# Patient Record
(demographics unavailable — no encounter records)

---

## 2024-10-14 NOTE — HISTORY OF PRESENT ILLNESS
[FreeTextEntry1] : LAST VISIT - 7/12/24 35yo male h/o intellectual disability, MVR presents for follow up after hospitalization with complicated diverticulitis resulting in abscess and perforation requiring ex lap with washout and small bowel resection for F/U today post repeat CT scan of Abdomen.   CT scan of Abdomen done 10/4/24 showed mild wall thickening of the sigmoid colon which may reflect mild diverticulitis.   Pt is now on antibiotics and has had mild diarrhea since started them, has had occasional loose stools over the past year. No fevers, abd pain, blood in the stool, weight loss, vomiting, heartburn, or dysphagia.   Pt was seen in Dec 2023, reporting diarrhea, had stool tests which were negative for infectious etiologies. He saw colorectal surgery 10/26/23 for an external thrombosed hemorrhoid and sitz baths were recommended along with high fiber diet.   C difficile negative GI PCR, ova/parasites, culture negative  [facility] Fax [phone]  [de-identified] : 10/4/24

## 2024-10-14 NOTE — ASSESSMENT
[FreeTextEntry1] : 37yo male h/o intellectual disability, MVR with hospitalization in 2/2023 with complicated diverticulitis resulting in abscess and perforation requiring ex lap with washout and small bowel resection.  #Complicated diverticulitis with abscess and perforation requiring small bowel resection (2023) #Possible mild diverticulitis of the sigmoid colon on repeat CT scan  #Altered bowel pattern Clinically improved, no infectious etiologies on stool tests -Discussed colonoscopy to further evaluate in setting of h/o complicated diverticulitis, risks/benefits/alternatives discussed in detail with pts mother and father, pt likely not able to tolerate colon prep or enemas for possible sigmoidoscopy, discussed options for elective admission, NGT prep however they feel pt will not comply- -CT colonography recommended in July but his parents felt that he could not tolerate it -Complete antibiotics  -Recommend fecal occult blood immunology for CRC screening; his father will collect the stool, risks/benefits discussed  -Diet discussed including adequate hydration and fiber intake -Sigmoidoscopy vs repeat CT scan of Abdomen discussed in 1-2 months; will repeat CT scan of Abdomen and pelvis with IV contrast in 6-8 weeks for now due to concerns with administrating the enemas, to readdress after CT scan -Repeat CBC, CMP, INR, ESR, and CRP  -F/U in December  -Pts father/mother agreeable with plan, advised to contact us if questions/concerns or if new symptoms develop

## 2024-10-14 NOTE — PHYSICAL EXAM
[Alert] : alert [No Acute Distress] : no acute distress [Well Developed] : well developed [Obese (BMI >= 30)] : obese (BMI >= 30) [Sclera] : the sclera and conjunctiva were normal [Hearing Threshold Finger Rub Not [place]] : hearing was normal [Normal Appearance] : the appearance of the neck was normal [No Respiratory Distress] : no respiratory distress [No Acc Muscle Use] : no accessory muscle use [Respiration, Rhythm And Depth] : normal respiratory rhythm and effort [Bowel Sounds] : normal bowel sounds [Abdomen Tenderness] : non-tender [No Masses] : no abdominal mass palpated [Abdomen Soft] : soft [Abnormal Walk] : normal gait [Normal Color / Pigmentation] : normal skin color and pigmentation [de-identified] : Abd soft, no tenderness elicited, well healed lower abdominal scar [de-identified] : Nonverbal so unable to assess orientation

## 2025-03-07 NOTE — HISTORY OF PRESENT ILLNESS
[FreeTextEntry1] : LAST VISIT - 10/14/24 38yo male h/o intellectual disability, MVR presents for follow up after hospitalization with complicated diverticulitis resulting in abscess and perforation requiring ex lap with washout and small bowel resection. Presents for follow up.   Pt presents with his parents. As per parents, they are unaware of all bowel movements pt has (especially while patient is at the group home during the week) however while home with them, bowel movements have been normal. As per parents' patient is not losing weight, no blood in the stool. Eating well.    Labs reviewed 10/14/24 CBC WNL ESR WNL  CRP WNL  CMP WNL   Pts mother and father on video call. Pt last seen in Dec 2023, reporting diarrhea, had stool tests which were negative for infectious etiologies. Per pts parents, he is doing well, bowel pattern regular, no recurrent diarrhea or blood in stools. Denies n/v or abdominal pain. Appetite good, weight stable. Colonoscopy discussed however deferred at prior visit as concern pt not able to tolerate prep, CT imaging not yet done. He saw colorectal surgery 10/26/23 for an external thrombosed hemorrhoid and sitz baths were recommended along with high fiber diet. Symptoms resolved  C difficile negative GI PCR, ova/parasites, culture negative  12/3/24 IFOBT Negative  CT Abdomen/pelvis w/oral  and w/ IV contrast 2/15/25 IMPRESSION: Colonic diverticulosis without diverticulitis. Postoperative changes. Simple cyst, right kidney.  [facility] Fax [phone]

## 2025-03-07 NOTE — PHYSICAL EXAM
[Alert] : alert [Normal Voice/Communication] : normal voice/communication [Healthy Appearing] : healthy appearing [No Acute Distress] : no acute distress [Sclera] : the sclera and conjunctiva were normal [Hearing Threshold Finger Rub Not [place]] : hearing was normal [Normal Lips/Gums] : the lips and gums were normal [Oropharynx] : the oropharynx was normal [Normal Appearance] : the appearance of the neck was normal [No Neck Mass] : no neck mass was observed [No Respiratory Distress] : no respiratory distress [No Acc Muscle Use] : no accessory muscle use [Respiration, Rhythm And Depth] : normal respiratory rhythm and effort [Auscultation Breath Sounds / Voice Sounds] : lungs were clear to auscultation bilaterally [Heart Rate And Rhythm] : heart rate was normal and rhythm regular [Normal S1, S2] : normal S1 and S2 [Murmurs] : no murmurs [Bowel Sounds] : normal bowel sounds [Abdomen Tenderness] : non-tender [No Masses] : no abdominal mass palpated [Abdomen Soft] : soft [] : no hepatosplenomegaly [Oriented To Time, Place, And Person] : oriented to person, place, and time [Rebound Tenderness] : no rebound tenderness

## 2025-03-07 NOTE — END OF VISIT
[Time Spent: ___ minutes] : I have spent [unfilled] minutes of time on the encounter which excludes teaching and separately reported services. [FreeTextEntry3] :  Pt seen/examined, agree with above findings and plan as outlined by Sarah Valencia NP.

## 2025-03-07 NOTE — ASSESSMENT
[FreeTextEntry1] : 36yo male h/o intellectual disability, MVR presents for follow up after hospitalization with complicated diverticulitis resulting in abscess and perforation requiring ex lap with washout and small bowel resection. Presents for follow up.  #Complicated diverticulitis with abscess and perforation requiring small bowel resection (2023) #Possible mild diverticulitis of the sigmoid colon on repeat CT scan #Altered bowel pattern Clinically improved, no infectious etiologies on stool tests, repeat CT without diverticulitis -Discussed colonoscopy again to further evaluate in setting of h/o complicated diverticulitis, risks/benefits/alternatives discussed in detail with pts mother and father, pt likely not able to tolerate colon prep or enemas for possible sigmoidoscopy, discussed options for elective admission, NGT prep however they feel pt will not comply -CT colonography recommended in July but his parents felt that he could not tolerate the prep -Fecal occult blood immunology for CRC screening negative 12/2024 -Diet discussed including adequate hydration and fiber intake -Reviewed CBC, CMP, INR, ESR, and CRP -Sigmoidoscopy vs repeat CT enterography discussed in 6 months -Will do CT Enterography in 6 months given after discussing with parents, patient unable to tolerate enemas and/or bowel preps for procedures.  -Discussed sitz baths for hemorrhoid relief if needed    Follow up in 6 months Pts father/mother agreeable with plan, advised to contact us if questions/concerns or if new symptoms develop.